# Patient Record
Sex: FEMALE | Race: WHITE | NOT HISPANIC OR LATINO | RURAL
[De-identification: names, ages, dates, MRNs, and addresses within clinical notes are randomized per-mention and may not be internally consistent; named-entity substitution may affect disease eponyms.]

---

## 2022-02-17 ENCOUNTER — OFFICE VISIT (OUTPATIENT)
Dept: RURAL CLINIC 4 | Facility: CLINIC | Age: 47
End: 2022-02-17
Payer: MEDICARE

## 2022-02-17 ENCOUNTER — WEB ENCOUNTER (OUTPATIENT)
Dept: RURAL CLINIC 4 | Facility: CLINIC | Age: 47
End: 2022-02-17

## 2022-02-17 DIAGNOSIS — R14.3 FLATULENCE: ICD-10-CM

## 2022-02-17 DIAGNOSIS — R11.14 BILIOUS VOMITING WITH NAUSEA: ICD-10-CM

## 2022-02-17 DIAGNOSIS — R19.4 CHANGE IN BOWEL HABITS: ICD-10-CM

## 2022-02-17 DIAGNOSIS — R19.7 DIARRHEA, UNSPECIFIED TYPE: ICD-10-CM

## 2022-02-17 DIAGNOSIS — Z86.010 HISTORY OF COLONIC POLYPS: ICD-10-CM

## 2022-02-17 DIAGNOSIS — R14.2 BELCHING: ICD-10-CM

## 2022-02-17 DIAGNOSIS — G43.909 MIGRAINE WITHOUT STATUS MIGRAINOSUS, NOT INTRACTABLE, UNSPECIFIED MIGRAINE TYPE: ICD-10-CM

## 2022-02-17 DIAGNOSIS — F31.9 BIPOLAR 1 DISORDER: ICD-10-CM

## 2022-02-17 DIAGNOSIS — R10.9 ABDOMINAL CRAMPING: ICD-10-CM

## 2022-02-17 PROBLEM — 37796009: Status: ACTIVE | Noted: 2022-02-17

## 2022-02-17 PROBLEM — 371596008: Status: ACTIVE | Noted: 2022-02-17

## 2022-02-17 PROCEDURE — 99204 OFFICE O/P NEW MOD 45 MIN: CPT | Performed by: INTERNAL MEDICINE

## 2022-02-17 RX ORDER — SUMATRIPTAN SUCCINATE 100 MG/1
1 TABLET AT LEAST 2 HOURS BETWEEN DOSES AS NEEDED TABLET, FILM COATED ORAL TWICE A DAY
Status: ACTIVE | COMMUNITY

## 2022-02-17 RX ORDER — BISACODYL 5 MG
TAKE 4 TABLET, DELAYED RELEASE (ENTERIC COATED) ORAL
Qty: 4 | OUTPATIENT
Start: 2022-02-17 | End: 2022-02-18

## 2022-02-17 RX ORDER — ALPRAZOLAM 1 MG/1
1 TABLET TABLET ORAL TWICE A DAY
Status: ACTIVE | COMMUNITY

## 2022-02-17 RX ORDER — DICYCLOMINE HYDROCHLORIDE 10 MG/1
1 TABLET CAPSULE ORAL
Status: ACTIVE | COMMUNITY

## 2022-02-17 RX ORDER — FUROSEMIDE 20 MG/1
1 TABLET TABLET ORAL ONCE A DAY
Status: ACTIVE | COMMUNITY

## 2022-02-17 RX ORDER — SODIUM, POTASSIUM,MAG SULFATES 17.5-3.13G
177 ML SOLUTION, RECONSTITUTED, ORAL ORAL
Qty: 1 KIT | Refills: 0 | OUTPATIENT
Start: 2022-02-17

## 2022-02-17 NOTE — HPI-TODAY'S VISIT:
patient comes on referral from Dr. Elijah Vang. a copy of the consultation will be sent to the referring physician. She had a colonoscopy in [place] and had polyps removed. she is currently having lots of bowel movements. can go up to 8 times a day. She says that there is a watery component. no nocturnal stools. no blood in the stool. she does get some abdominal pain and cramping. eating can trigger it. has been getting sour acid reflux. she does vomit at least twice a day. that has been going on for years. She was diagnosed with "gastopariasis". was on reglan for years.

## 2022-03-29 ENCOUNTER — LAB OUTSIDE AN ENCOUNTER (OUTPATIENT)
Dept: URBAN - METROPOLITAN AREA CLINIC 105 | Facility: CLINIC | Age: 47
End: 2022-03-29

## 2022-04-05 LAB
CALPROTECTIN, STOOL - QDX: (no result)
FECAL FAT, QUALITATIVE: (no result)
GASTROINTESTINAL PATHOGEN: (no result)
PANCREATICELASTASE ELISA, STOOL: (no result)

## 2022-04-08 PROBLEM — 62315008: Status: ACTIVE | Noted: 2022-04-08

## 2022-04-08 PROBLEM — 428283002: Status: ACTIVE | Noted: 2022-02-17

## 2022-04-19 ENCOUNTER — TELEPHONE ENCOUNTER (OUTPATIENT)
Dept: URBAN - METROPOLITAN AREA CLINIC 92 | Facility: CLINIC | Age: 47
End: 2022-04-19

## 2022-04-22 ENCOUNTER — TELEPHONE ENCOUNTER (OUTPATIENT)
Dept: RURAL MEDICAL CENTER 4 | Facility: MEDICAL CENTER | Age: 47
End: 2022-04-22

## 2022-04-26 ENCOUNTER — OFFICE VISIT (OUTPATIENT)
Dept: RURAL MEDICAL CENTER 4 | Facility: MEDICAL CENTER | Age: 47
End: 2022-04-26
Payer: MEDICARE

## 2022-04-26 ENCOUNTER — CLAIMS CREATED FROM THE CLAIM WINDOW (OUTPATIENT)
Dept: RURAL MEDICAL CENTER 4 | Facility: MEDICAL CENTER | Age: 47
End: 2022-04-26
Payer: MEDICARE

## 2022-04-26 DIAGNOSIS — B96.81 BACTERIAL INFECTION DUE TO H. PYLORI: ICD-10-CM

## 2022-04-26 DIAGNOSIS — R10.84 ABDOMINAL CRAMPING, GENERALIZED: ICD-10-CM

## 2022-04-26 DIAGNOSIS — K29.60 ADENOPAPILLOMATOSIS GASTRICA: ICD-10-CM

## 2022-04-26 DIAGNOSIS — R19.7 ACUTE DIARRHEA: ICD-10-CM

## 2022-04-26 PROCEDURE — 43239 EGD BIOPSY SINGLE/MULTIPLE: CPT | Performed by: INTERNAL MEDICINE

## 2022-04-26 PROCEDURE — 45380 COLONOSCOPY AND BIOPSY: CPT | Performed by: INTERNAL MEDICINE

## 2022-04-26 RX ORDER — SODIUM, POTASSIUM,MAG SULFATES 17.5-3.13G
177 ML SOLUTION, RECONSTITUTED, ORAL ORAL
Qty: 1 KIT | Refills: 0 | Status: ACTIVE | COMMUNITY
Start: 2022-02-17

## 2022-04-26 RX ORDER — DICYCLOMINE HYDROCHLORIDE 10 MG/1
1 TABLET CAPSULE ORAL
Status: ACTIVE | COMMUNITY

## 2022-04-26 RX ORDER — ALPRAZOLAM 1 MG/1
1 TABLET TABLET ORAL TWICE A DAY
Status: ACTIVE | COMMUNITY

## 2022-04-26 RX ORDER — SUMATRIPTAN SUCCINATE 100 MG/1
1 TABLET AT LEAST 2 HOURS BETWEEN DOSES AS NEEDED TABLET, FILM COATED ORAL TWICE A DAY
Status: ACTIVE | COMMUNITY

## 2022-04-26 RX ORDER — FUROSEMIDE 20 MG/1
1 TABLET TABLET ORAL ONCE A DAY
Status: ACTIVE | COMMUNITY

## 2022-04-28 ENCOUNTER — TELEPHONE ENCOUNTER (OUTPATIENT)
Dept: URBAN - METROPOLITAN AREA CLINIC 105 | Facility: CLINIC | Age: 47
End: 2022-04-28

## 2022-04-28 RX ORDER — OMEPRAZOLE 40 MG/1
1 CAPSULE CAPSULE, DELAYED RELEASE ORAL
Qty: 20 | Refills: 0 | OUTPATIENT

## 2022-04-28 RX ORDER — DOXYCYCLINE HYCLATE 100 MG/1
1 CAPSULE CAPSULE ORAL
Qty: 20 | Refills: 0 | OUTPATIENT

## 2022-04-28 RX ORDER — CLARITHROMYCIN 500 MG/1
1 TABLET TABLET, FILM COATED ORAL
Qty: 20 | Refills: 0 | OUTPATIENT

## 2022-07-20 ENCOUNTER — TELEPHONE ENCOUNTER (OUTPATIENT)
Dept: RURAL CLINIC 4 | Facility: CLINIC | Age: 47
End: 2022-07-20

## 2022-10-24 ENCOUNTER — DASHBOARD ENCOUNTERS (OUTPATIENT)
Age: 47
End: 2022-10-24

## 2022-10-26 ENCOUNTER — OFFICE VISIT (OUTPATIENT)
Dept: RURAL CLINIC 8 | Facility: CLINIC | Age: 47
End: 2022-10-26

## 2022-10-26 RX ORDER — SUMATRIPTAN SUCCINATE 100 MG/1
1 TABLET AT LEAST 2 HOURS BETWEEN DOSES AS NEEDED TABLET, FILM COATED ORAL TWICE A DAY
Status: ACTIVE | COMMUNITY

## 2022-10-26 RX ORDER — DOXYCYCLINE HYCLATE 100 MG/1
1 CAPSULE CAPSULE ORAL
Qty: 20 | Refills: 0 | Status: ACTIVE | COMMUNITY

## 2022-10-26 RX ORDER — FUROSEMIDE 20 MG/1
1 TABLET TABLET ORAL ONCE A DAY
Status: ACTIVE | COMMUNITY

## 2022-10-26 RX ORDER — ALPRAZOLAM 1 MG/1
1 TABLET TABLET ORAL TWICE A DAY
Status: ACTIVE | COMMUNITY

## 2022-10-26 RX ORDER — DICYCLOMINE HYDROCHLORIDE 10 MG/1
1 TABLET CAPSULE ORAL
Status: ACTIVE | COMMUNITY

## 2022-10-26 RX ORDER — CLARITHROMYCIN 500 MG/1
1 TABLET TABLET, FILM COATED ORAL
Qty: 20 | Refills: 0 | Status: ACTIVE | COMMUNITY

## 2022-10-26 RX ORDER — OMEPRAZOLE 40 MG/1
1 CAPSULE CAPSULE, DELAYED RELEASE ORAL
Qty: 20 | Refills: 0 | Status: ACTIVE | COMMUNITY

## 2022-10-26 NOTE — HPI-TODAY'S VISIT:
patient comes on referral from Dr. Elijah Vang. a copy of the consultation will be sent to the referring physician. She had a colonoscopy in [place] and had polyps removed. she is currently having lots of bowel movements. can go up to 8 times a day. She says that there is a watery component. no nocturnal stools. no blood in the stool. she does get some abdominal pain and cramping. eating can trigger it. has been getting sour acid reflux. she does vomit at least twice a day. that has been going on for years. She was diagnosed with "gastopariasis". was on reglan for years. -   10/26/2022:  In April of 2022 I performed her EGD and colonoscopy both of which were endoscopically normal.  Biopsies from the terminal ileum duodenum and colon were all normal.  Biopsies were positive for H pylori in the antrum.  Stool studies were negative for infection inflammation and malabsorption.  I  prescribed triple therapy for the H pylori infection